# Patient Record
Sex: MALE | Race: WHITE | URBAN - METROPOLITAN AREA
[De-identification: names, ages, dates, MRNs, and addresses within clinical notes are randomized per-mention and may not be internally consistent; named-entity substitution may affect disease eponyms.]

---

## 2018-05-22 ENCOUNTER — HOSPITAL ENCOUNTER (OUTPATIENT)
Dept: LAB | Age: 53
Discharge: HOME | End: 2018-05-22

## 2018-05-22 ENCOUNTER — OFFICE VISIT (OUTPATIENT)
Dept: DERMATOLOGY | Facility: AMBULATORY SURGERY CENTER | Age: 53
End: 2018-05-22

## 2018-05-22 VITALS
BODY MASS INDEX: 29.8 KG/M2 | DIASTOLIC BLOOD PRESSURE: 90 MMHG | OXYGEN SATURATION: 98 % | RESPIRATION RATE: 16 BRPM | WEIGHT: 220 LBS | HEIGHT: 72 IN | HEART RATE: 67 BPM | SYSTOLIC BLOOD PRESSURE: 118 MMHG

## 2018-05-22 DIAGNOSIS — Z85.828 HISTORY OF NONMELANOMA SKIN CANCER: ICD-10-CM

## 2018-05-22 DIAGNOSIS — D48.5 NEOPLASM OF UNCERTAIN BEHAVIOR OF SKIN OF NOSE: Primary | ICD-10-CM

## 2018-05-22 RX ORDER — SILODOSIN 8 MG/1
CAPSULE ORAL
Refills: 11 | COMMUNITY
Start: 2018-05-16

## 2018-05-22 NOTE — PROGRESS NOTES
Name: Sami Sanders       Age: 46 y.o. Date: 5/22/2018    Chief Complaint:   Chief Complaint   Patient presents with    Skin Exam       Subjective:    HPI:  Mr.. Sami Sanders is a 46 y.o. male who presents for the evaluation of a lesion on the left nasal sidewall. He was last here for similar in 2015. He states that the lesion appeared 1 years ago. The patient has had prior treatment for this lesion in the [facility] was treated 2012. Associated symptoms include scaly pink area with occasional bleeding. ROS: Consitutional: Negative  Dermatological : positive for - skin lesion changes      Social History     Social History    Marital status:      Spouse name: N/A    Number of children: N/A    Years of education: N/A     Occupational History    Not on file. Social History Main Topics    Smoking status: Never Smoker    Smokeless tobacco: Never Used    Alcohol use Not on file    Drug use: Not on file    Sexual activity: Not on file     Other Topics Concern    Not on file     Social History Narrative       History reviewed. No pertinent family history. Past Medical History:   Diagnosis Date    Skin cancer        Past Surgical History:   Procedure Laterality Date    AMB POC MOHS 1 STAGE H/N/HF/G  2012    HX HEENT  1985    nose    HX ORTHOPAEDIC         Current Outpatient Prescriptions   Medication Sig Dispense Refill    RAPAFLO 8 mg capsule TAKE 1 CAPSULE BY MOUTH EVERY DAY  11    VOLTAREN 1 % topical gel   2       No Known Allergies      Objective:    Visit Vitals    /90 (BP 1 Location: Left arm, BP Patient Position: Sitting)    Pulse 67    Resp 16    Ht 6' (1.829 m)    Wt 99.8 kg (220 lb)    SpO2 98%    BMI 29.84 kg/m2       Sami Sanders is a 46 y.o. male who appears well and in no distress. He is awake, alert, and oriented. There is no preauricular, submandibular, or cervical lymphadenopathy. A limited skin examination was completed including the nose. There is a 8 x 8 mm pink macule with two dots of hemorrhagic crusting. This does not have telangiectasias consistent with BCC but is shiny. There is a small white scar just superior to this. Assessment/Plan: 1. Neoplasm of Uncertain Behavior, left nasal sidewall. The differential diagnoses were discussed. A shave biopsy was advised to sample this lesion. The procedure was reviewed and verbal and written consent were obtained. The risks of pain, bleeding, infection, and scar were discussed. The patient is aware that this is a sample and is intended for diagnosis and not therapy of the skin lesion. I performed the procedure. The site was cleansed and anesthetized with 1% Lidocaine with Epinephrine 1:100,000. A shave biopsy was performed to sample the lesion. Drysol was used for hemostasis. The wound was bandaged and care reviewed. The specimen was sent to pathology. I will contact the patient with the results and any further treatment that may be necessary. 2. Personal history of skin cancer. I discussed sun protection, sunscreen use, the warning signs of skin cancer, the need for self-skin examinations, and the need for regular practitioner exams every 1 year. The patient should follow up sooner as needed if new, changing, or symptomatic skin lesions arise. [facility]   OFFICE PROCEDURE PROGRESS NOTE   Chart reviewed for the following:   IFer, have reviewed the History, Physical and updated the Allergic reactions for Rowan Esters. TIME OUT performed immediately prior to start of procedure:   IIsabell, have performed the following reviews on Rowan Esters   prior to the start of the procedure:     * Patient was identified by name and date of birth   * Agreement on procedure being performed was verified   * Risks and Benefits explained to the patient   * Procedure site verified and marked as necessary * Patient was positioned for comfort   * Consent was signed and verified     Time: 3542  Date of procedure: 5/22/2018  Procedure performed by: Kenyetta Odom  Provider assisted by: lpn   Patient assisted by: self   How tolerated by patient: tolerated the procedure well with no complications   Comments: none

## 2018-05-22 NOTE — MR AVS SNAPSHOT
[facility] [address] 
[phone] Patient: Nikolai Goode MRN: [id number] :1965 Visit Information Date & Time Provider Department Dept. Phone Encounter #  
 2018  9:15 AM VAZQUEZ James [id number] [phone] Upcoming Health Maintenance Date Due Hepatitis C Screening 1965 DTaP/Tdap/Td series (1 - Tdap) 1986 FOBT Q 1 YEAR AGE 50-75 2015 Influenza Age 5 to Adult 2018 Allergies as of 2018  Review Complete On: 2018 By: Sue Sykes LPN No Known Allergies Current Immunizations  Never Reviewed No immunizations on file. Not reviewed this visit Vitals BP Pulse Resp Height(growth percentile) Weight(growth percentile) SpO2  
 118/90 (BP 1 Location: Left arm, BP Patient Position: Sitting) 67 16 6' (1.829 m) 220 lb (99.8 kg) 98% BMI Smoking Status 29.84 kg/m2 Never Smoker BMI and BSA Data Body Mass Index Body Surface Area  
 29.84 kg/m 2 2.25 m 2 Your Updated Medication List  
  
   
This list is accurate as of 18  9:32 AM.  Always use your most recent med list.  
  
  
  
  
 RAPAFLO 8 mg capsule Generic drug:  silodosin TAKE 1 CAPSULE BY MOUTH EVERY DAY  
  
 VOLTAREN 1 % Gel Generic drug:  diclofenac Patient Instructions Self Skin Exam and Sunscreens Early detection and treatment is essential in the treatment of all forms of skin cancer. If caught early, all forms of skin cancer are curable. In addition to your regular visits, you should perform a monthly skin examination. Over time, you become familiar with what is normally found on your skin and can identify new or suspicious spots. One of the screening tools you can use to assess your skin is to follow the ABCDEs: 
 
A= Asymmetry (One half is unlike the other half) B= Border (An irregular, scalloped or poorly defined edge) C= Color (Is varied from one area to another, has shades of tan, brown/ black,       white, red or blue) D= Diameter (Spots larger than 6mm or a pencil eraser) E= Evolving (New spots or one that is changing in size, shape, or color) A follow- up interval will be customized based on your history of skin cancer or level of skin damage and risk factors. In any case, if you notice a suspicious or new spot, an appointment should be arranged between regular visits. Everyone should use sunscreen and sun-safe practices, which is especially important for those with a personal or family history of skin cancer. Suggestions for this include: 1. Use daily moisturizers containing SPF 30 or higher. 2. Wear long sleeve clothing with UPF ratings and a broad-brimmed hat. 3. Apply sunscreen with SPF 30 or higher to all sun exposed areas if you are going to be in the sun. A broad spectrum UVA/ UVB sunscreen is best.  Dont forget to REAPPLY every two hours or more often if swimming or sweating! 4. Avoid outside activities during peak sun hours, especially in the summer (10am- 2pm). 5. DO NOT use tanning beds. Using sunscreen and sun-safe practices can help reduce the likelihood of developing skin cancer or additional skin cancers in those previously diagnosed. Introducing [facility]! Charo Swenson introduces [employer] patient portal. Now you can access parts of your medical record, email your doctor's office, and request medication refills online. 1. In your internet browser, go to [email or web address] 2. Click on the First Time User? Click Here link in the Sign In box. You will see the New Member Sign Up page. 3. Enter your [employer] Access Code exactly as it appears below. You will not need to use this code after youve completed the sign-up process.  If you do not sign up before the expiration date, you must request a new code. · [employer] Access Code: [email or web address] Expires: 8/20/2018  9:30 AM 
 
4. Enter the last four digits of your Social Security Number (xxxx) and Date of Birth (mm/dd/yyyy) as indicated and click Submit. You will be taken to the next sign-up page. 5. Create a [employer] ID. This will be your [employer] login ID and cannot be changed, so think of one that is secure and easy to remember. 6. Create a [employer] password. You can change your password at any time. 7. Enter your Password Reset Question and Answer. This can be used at a later time if you forget your password. 8. Enter your e-mail address. You will receive e-mail notification when new information is available in [address] 9. Click Sign Up. You can now view and download portions of your medical record. 10. Click the Download Summary menu link to download a portable copy of your medical information. If you have questions, please visit the Frequently Asked Questions section of the [employer] website. Remember, [employer] is NOT to be used for urgent needs. For medical emergencies, dial 911. Now available from your iPhone and Android! Please provide this summary of care documentation to your next provider. If you have any questions after today's visit, please call [phone].

## 2018-05-29 DIAGNOSIS — C44.91 SUPERFICIAL BASAL CELL CARCINOMA: Primary | ICD-10-CM

## 2018-05-29 RX ORDER — FLUOROURACIL 50 MG/G
CREAM TOPICAL
Qty: 40 G | Refills: 0 | Status: SHIPPED | OUTPATIENT
Start: 2018-05-29

## 2018-05-29 NOTE — PROGRESS NOTES
I spoke with the patient and he states he is doing well from the biopsy. He understands the results of superficial basal cell carcinoma and lentigo. I offered options of Mohs surgery versus topical 5 fluorouracil. He elects to use 5 fluorouracil knowing the cure rate is in the 80s. We discussed the appropriate application as well as common side effects. His questions were answered and a prescription was sent to his pharmacy of choice.

## 2023-01-10 NOTE — PROGRESS NOTES
ASSESSMENT/PLAN:  Below is the assessment and plan developed based on review of pertinent history, physical exam, labs, studies, and medications. 1. Hip pain  -     XR PELV 1 OR 2 V; Future  2. Tear of left acetabular labrum, initial encounter  -     MRI HIP LT WO CONT; Future    Return for Follow-up after diagnostic test.    In discussion with the patient, we considered the numerus possible diagnoses that could be contributing to their present symptoms. We also deliberated on the extensive management options that must be considered to treat their current condition. We reviewed their accessible prior medical records, diagnostic tests, and current health and employment information. We considered how these symptoms were affecting the patient´s activities of daily living as well as employment and fitness activities. The patient had various questions regarding the possible risks, benefits, complications, morbidity and mortality regarding their diagnosis and treatment options. The patients´ comorbidities were considered, and I advocated that they consider maximizing lifestyle modification through nutrition and exercise to aid in addressing their symptoms. Shared decision making yielded an understanding to move forward with conservation treatment preferences. The patient expressed understanding that if conservative management fails to alleviate the present symptoms they will return to office for re-evaluation and consideration of additional diagnostic tests and potential surgical options. In the interim, we have recommended ice, elevation, and take prescription anti-inflammatory medications along with a physician directed home exercise program. We discussed the risks and common side effects of anti-inflammatory medications and instructed the patient to discontinue the medication and contact us if they experienced any side effects.  The patient was encouraged to discuss the possible side effects with their family physician or pharmacist prior to initiating any new medications. We discussed the fact that many of the recommended treatment options presented are significantly limited by the patient´s social determinants of health. We also reviewed the circumstances surrounding the environment that they live and work which affect a wide range of health risk. We considered the limited access to appropriate educational resources regarding proper nutrition and exercise as well as the economic and social support necessary to maintain health and wellbeing. Given that the patient's symptoms are increasing in frequency and duration, we have decided to evaluate the etiology of the pain and loss of function with an MRI. We discussed the risks of an MRI which include, but are not limited to the enclosed space, noisy environment, magnetic effect on implanted metal. We also talked about the fact that MRI is also contraindicated in the presence of internal metallic objects such as bullets or shrapnel, as well as surgical clips, pins, plates, screws, metal sutures, or wire mesh. We talked about the fact that MRI does not use radiation, but it may be contrindicated if the patient has implanted pacemakers, intracranial aneurysm clips, cochlear implants, certain prosthetic devices, implanted drug infusion pumps, neurostimulators, bone-growth stimulators, certain intrauterine contraceptive devices; or any other type of iron-based metal implants. We discussed the fact that if you are pregnant or suspect that you may be pregnant, you should notify your physician and consult with your primary care or obstetrician before having an MRI. Although rare, we talked about the fact that if contrast dye is used, there is a risk for allergic reaction to the dye. Patients who are allergic to or sensitive to medications, contrast dye, iodine, or shellfish should notify the radiologist or technologist prior to the administration of dye.  MRI contrast may also influence other conditions such as allergies, asthma, anemia, hypotension (low blood pressure), and sickle cell disease. The patient has expressed understanding of these risks and I will see the patient back after the MRI to discuss the findings as well as the treatment options. Given his lengthy history with his left hip as well as conservative management we will proceed with an MRI to further evaluate his labrum. We talked about sending him to one of our hip specialists for more discussion on definitive treatment. We will see him back after his MRI discussed finding as well as treatment options. SUBJECTIVE/OBJECTIVE:  Nikko Flores (: 1965) is a 62 y.o. male, patient,here for evaluation of the Hip Pain (Left Hip Pain)  . Patient seen today for the left hip. Unfortunately he has had hip pain since 2022. He is very active with golf, tennis as well as fitness. He reports he has had stabbing pain in the left hip. He has seen a chiropractor, massage therapist, and a physical therapist in an effort to help his hip pain. They worked on rest anti-inflammatory medication as well as stretching and strengthening. He ports he continues to have sharp pain with any twisting. He reports he is unable to run. He has difficulty at night and it wakes him up. He is tried some ice elevation as well as some anti-inflammatory medications. He denies any back pain or numbness or tingling. PHYSICAL EXAM:    Examination left hip reveals he is lost considerable amount of internal rotation. He has about 30 degrees of external rotation. He has good forward flexion. He is tender palpation directly over the anterior aspect of the hip. He has discomfort with impingement maneuvers. His hip is stable. He has good strength he is neuro vastly intact distally. IMAGING:    I have independently reviewed and interpreted the following images: AP pelvis show evidence of impingement on the left hip.   He does have some early degenerative changes as well. No Known Allergies    Current Outpatient Medications   Medication Sig    fluorouracil (EFUDEX) 5 % chemo cream Apply a thin layer to left side of nose two times daily for 4 weeks    RAPAFLO 8 mg capsule TAKE 1 CAPSULE BY MOUTH EVERY DAY    VOLTAREN 1 % topical gel      No current facility-administered medications for this visit. Past Medical History:   Diagnosis Date    Skin cancer        Past Surgical History:   Procedure Laterality Date    AMB POC MOHS 1 STAGE H/N/HF/G  2012    HX HEENT  1985    nose    HX ORTHOPAEDIC         No family history on file. Social History     Socioeconomic History    Marital status:      Spouse name: Not on file    Number of children: Not on file    Years of education: Not on file    Highest education level: Not on file   Occupational History    Not on file   Tobacco Use    Smoking status: Never    Smokeless tobacco: Never   Substance and Sexual Activity    Alcohol use: Not on file    Drug use: Not on file    Sexual activity: Not on file   Other Topics Concern    Not on file   Social History Narrative    Not on file     Social Determinants of Health     Financial Resource Strain: Not on file   Food Insecurity: Not on file   Transportation Needs: Not on file   Physical Activity: Not on file   Stress: Not on file   Social Connections: Not on file   Intimate Partner Violence: Not on file   Housing Stability: Not on file       Review of Systems    No flowsheet data found. Vitals:  Ht 6' (1.829 m)   Wt 220 lb (99.8 kg)   BMI 29.84 kg/m²    Body mass index is 29.84 kg/m². An electronic signature was used to authenticate this note.   -- Enid Matamoros MD

## 2023-01-11 ENCOUNTER — OFFICE VISIT (OUTPATIENT)
Dept: ORTHOPEDIC SURGERY | Age: 58
End: 2023-01-11
Payer: COMMERCIAL

## 2023-01-11 VITALS — BODY MASS INDEX: 29.8 KG/M2 | WEIGHT: 220 LBS | HEIGHT: 72 IN

## 2023-01-11 DIAGNOSIS — M25.559 HIP PAIN: Primary | ICD-10-CM

## 2023-01-11 DIAGNOSIS — S73.192A TEAR OF LEFT ACETABULAR LABRUM, INITIAL ENCOUNTER: ICD-10-CM

## 2023-01-11 PROCEDURE — 99214 OFFICE O/P EST MOD 30 MIN: CPT | Performed by: ORTHOPAEDIC SURGERY

## 2023-01-23 ENCOUNTER — OFFICE VISIT (OUTPATIENT)
Dept: ORTHOPEDIC SURGERY | Age: 58
End: 2023-01-23
Payer: COMMERCIAL

## 2023-01-23 VITALS — HEIGHT: 72 IN | BODY MASS INDEX: 29.8 KG/M2 | WEIGHT: 220 LBS

## 2023-01-23 DIAGNOSIS — M16.12 ARTHRITIS OF LEFT HIP: Primary | ICD-10-CM

## 2023-01-23 PROCEDURE — 99214 OFFICE O/P EST MOD 30 MIN: CPT | Performed by: ORTHOPAEDIC SURGERY

## 2023-01-23 NOTE — PROGRESS NOTES
Sharon Cotter (: 1965) is a 62 y.o. male patient, here for evaluation of the following chief complaint(s):  Hip Pain (Left hip pain/)       ASSESSMENT/PLAN:  Below is the assessment and plan developed based on review of pertinent history, physical exam, labs, studies, and medications. 51-year-old male comes in today as referral from one of my partners. He is here today for left-sided hip pain. The pain is in his groin and thigh. It has been going on since July. He said that it has continued to worsen over the past 6 months. He has been taking oral Tylenol. He does not typically take anti-inflammatories. He has also tried Voltaren gel. He is pursued activity modification and some therapy. He says that currently his pain is moderate with stairs and severe with walking on uneven surface. He says that he has moderate to severe pain when rising from sitting and moderate pain bending over to  an object. Has moderate pain lying in bed in mild pain sitting. I reviewed his previous records as well as an MRI and x-rays that were completed. These reveal subchondral edema in the acetabulum with diffuse labral tearing throughout. He has some thinning of the cartilage of the femoral head. I discussed these findings with him. I told her we could consider possible intra-articular injection he did not want to do this because this was a Band-Aid. We discussed the possibility of a labral reconstruction however I am not sure he would do well with this considering his age and the fact that he has subchondral edema associated with arthritic change in his acetabulum. We discussed the possibility of total hip arthroplasty. I told there is certainly no urgency to this however he has had symptoms for 6 months without reprieve. He is going to think about his options. I gave him information regarding total hip arthroplasty as an outpatient.   He understands risks and benefits of surgery if he elects to proceed. 1. Arthritis of left hip      Encounter Diagnosis   Name Primary? Arthritis of left hip Yes        No follow-ups on file. SUBJECTIVE/OBJECTIVE:  Mayco Maza (: 1965) is a 62 y.o. male who presents today for the following:  Chief Complaint   Patient presents with    Hip Pain     Left hip pain         80-year-old male comes in today as referral from one of my partners. He is here today for left-sided hip pain. The pain is in his groin and thigh. It has been going on since July. He said that it has continued to worsen over the past 6 months. He has been taking oral Tylenol. He does not typically take anti-inflammatories. He has also tried Voltaren gel. He is pursued activity modification and some therapy. He says that currently his pain is moderate with stairs and severe with walking on uneven surface. He says that he has moderate to severe pain when rising from sitting and moderate pain bending over to  an object. Has moderate pain lying in bed in mild pain sitting. IMAGING:    I independently reviewed his x-rays as well as service MRI of the left hip. The joint space shows some very mild thinning. He has a subchondral cyst noted on x-ray in the acetabulum. The MRI reveals diffuse labral tearing with subchondral edema associated with grade 4 changes of the acetabulum. There is some thinning of the cartilage of the femoral head    XR Results (most recent):  No results found for this or any previous visit. No Known Allergies    Current Outpatient Medications   Medication Sig    fluorouracil (EFUDEX) 5 % chemo cream Apply a thin layer to left side of nose two times daily for 4 weeks    RAPAFLO 8 mg capsule TAKE 1 CAPSULE BY MOUTH EVERY DAY    VOLTAREN 1 % topical gel      No current facility-administered medications for this visit.        Past Medical History:   Diagnosis Date    Skin cancer         Past Surgical History:   Procedure Laterality Date    AMB POC MOHS 1 STAGE H/N/HF/G  2012    HX HEENT  1985    nose    HX ORTHOPAEDIC         Family History   Problem Relation Age of Onset    OSTEOARTHRITIS Mother     Heart Disease Father         Social History     Tobacco Use    Smoking status: Never    Smokeless tobacco: Never   Substance Use Topics    Alcohol use: Yes     Alcohol/week: 1.0 standard drink     Types: 1 Cans of beer per week        All systems reviewed x 12 and were negative with the exception of None      No flowsheet data found. Vitals:  Ht 6' (1.829 m)   Wt 220 lb (99.8 kg)   BMI 29.84 kg/m²    Body mass index is 29.84 kg/m². Physical Exam    General: NAD, well developed, well nourished. Cardiac: Extremities well perfused. Respiratory: Nonlabored breathing. RLE: No antalgic gait. Negative stinchfield. 0-100 degrees of flexion. >20 degrees internal rotation, >30 degrees external rotation. Negative JOSÉ MIGUEL. No pain with flexion adduction and internal rotation. .  Motor strength grossly intact. LLE: Mild antalgic gait. Positive stinchfield. 0-100 degrees of flexion. >20 degrees internal rotation, >30 degrees external rotation. Negative JOSÉ MIGUEL. Mild pain with flexion adduction and internal rotation. .  Motor strength grossly intact. Skin: Warm well perfused. Vascular: Palpable pedal pulses bilaterally. Equal. Capillary refill less than 2 seconds. An electronic signature was used to authenticate this note.   -- Rosario Wilson MD

## 2023-12-13 DIAGNOSIS — Z98.890 STATUS POST HIP SURGERY: Primary | ICD-10-CM

## 2023-12-13 RX ORDER — DEXAMETHASONE 4 MG/1
4 TABLET ORAL
Qty: 4 TABLET | Refills: 0 | Status: SHIPPED | OUTPATIENT
Start: 2023-12-13 | End: 2023-12-17

## 2023-12-13 RX ORDER — OXYCODONE HYDROCHLORIDE 5 MG/1
5 TABLET ORAL EVERY 4 HOURS PRN
Qty: 42 TABLET | Refills: 0 | Status: SHIPPED | OUTPATIENT
Start: 2023-12-13 | End: 2023-12-20

## 2023-12-13 RX ORDER — NALOXONE HYDROCHLORIDE 4 MG/.1ML
1 SPRAY NASAL PRN
Qty: 1 EACH | Refills: 0 | Status: SHIPPED | OUTPATIENT
Start: 2023-12-13

## 2023-12-13 RX ORDER — MELOXICAM 7.5 MG/1
7.5 TABLET ORAL DAILY
Qty: 30 TABLET | Refills: 1 | Status: SHIPPED | OUTPATIENT
Start: 2023-12-13

## 2023-12-13 RX ORDER — TRAMADOL HYDROCHLORIDE 50 MG/1
50 TABLET ORAL EVERY 6 HOURS PRN
Qty: 42 TABLET | Refills: 0 | Status: SHIPPED | OUTPATIENT
Start: 2023-12-13 | End: 2023-12-24

## 2023-12-13 RX ORDER — ASPIRIN 81 MG/1
81 TABLET, CHEWABLE ORAL 2 TIMES DAILY
Qty: 60 TABLET | Refills: 0 | Status: SHIPPED | OUTPATIENT
Start: 2023-12-13

## 2023-12-13 RX ORDER — FAMOTIDINE 20 MG/1
20 TABLET, FILM COATED ORAL 2 TIMES DAILY
Qty: 60 TABLET | Refills: 0 | Status: SHIPPED | OUTPATIENT
Start: 2023-12-13

## 2024-12-04 ENCOUNTER — HOSPITAL ENCOUNTER (OUTPATIENT)
Facility: HOSPITAL | Age: 59
Discharge: HOME | End: 2024-12-07

## 2024-12-04 DIAGNOSIS — I65.29 CAROTID ATHEROSCLEROSIS, UNSPECIFIED LATERALITY: ICD-10-CM

## 2024-12-04 PROCEDURE — 75571 CT HRT W/O DYE W/CA TEST: CPT

## 2024-12-27 ENCOUNTER — HOSPITAL ENCOUNTER (OUTPATIENT)
Facility: HOSPITAL | Age: 59
Discharge: HOME | End: 2024-12-30
Payer: COMMERCIAL

## 2024-12-27 DIAGNOSIS — R91.1 PULMONARY NODULE: ICD-10-CM

## 2024-12-27 PROCEDURE — 6360000004 HC RX CONTRAST MEDICATION: Performed by: FAMILY MEDICINE

## 2024-12-27 PROCEDURE — 71260 CT THORAX DX C+: CPT

## 2024-12-27 RX ORDER — IOPAMIDOL 612 MG/ML
100 INJECTION, SOLUTION INTRAVASCULAR
Status: DISCONTINUED | OUTPATIENT
Start: 2024-12-27 | End: 2024-12-31 | Stop reason: HOSPADM

## 2024-12-27 RX ORDER — IOPAMIDOL 755 MG/ML
100 INJECTION, SOLUTION INTRAVASCULAR
Status: COMPLETED | OUTPATIENT
Start: 2024-12-27 | End: 2024-12-27

## 2024-12-27 RX ADMIN — IOPAMIDOL 100 ML: 755 INJECTION, SOLUTION INTRAVENOUS at 11:22

## 2025-02-03 ENCOUNTER — HOSPITAL ENCOUNTER (OUTPATIENT)
Facility: HOSPITAL | Age: 60
Discharge: HOME | End: 2025-02-06
Payer: COMMERCIAL

## 2025-02-03 DIAGNOSIS — R91.1 COIN LESION: ICD-10-CM

## 2025-02-03 PROCEDURE — 6360000004 HC RX CONTRAST MEDICATION: Performed by: RADIOLOGY

## 2025-02-03 PROCEDURE — 71260 CT THORAX DX C+: CPT

## 2025-02-03 RX ORDER — IOPAMIDOL 612 MG/ML
100 INJECTION, SOLUTION INTRAVASCULAR
Status: COMPLETED | OUTPATIENT
Start: 2025-02-03 | End: 2025-02-03

## 2025-02-03 RX ADMIN — IOPAMIDOL 100 ML: 612 INJECTION, SOLUTION INTRAVENOUS at 16:14
